# Patient Record
Sex: MALE | Race: WHITE | NOT HISPANIC OR LATINO | Employment: FULL TIME | URBAN - NONMETROPOLITAN AREA
[De-identification: names, ages, dates, MRNs, and addresses within clinical notes are randomized per-mention and may not be internally consistent; named-entity substitution may affect disease eponyms.]

---

## 2017-01-25 ENCOUNTER — TRANSCRIBE ORDERS (OUTPATIENT)
Dept: ADMINISTRATIVE | Facility: HOSPITAL | Age: 22
End: 2017-01-25

## 2017-01-25 DIAGNOSIS — R00.2 PALPITATIONS: Primary | ICD-10-CM

## 2017-02-06 ENCOUNTER — APPOINTMENT (OUTPATIENT)
Dept: CARDIOLOGY | Facility: HOSPITAL | Age: 22
End: 2017-02-06

## 2017-02-13 ENCOUNTER — APPOINTMENT (OUTPATIENT)
Dept: CARDIOLOGY | Facility: HOSPITAL | Age: 22
End: 2017-02-13

## 2018-10-01 ENCOUNTER — OFFICE VISIT (OUTPATIENT)
Dept: ORTHOPEDIC SURGERY | Facility: CLINIC | Age: 23
End: 2018-10-01

## 2018-10-01 VITALS
HEIGHT: 78 IN | WEIGHT: 180 LBS | DIASTOLIC BLOOD PRESSURE: 71 MMHG | SYSTOLIC BLOOD PRESSURE: 125 MMHG | BODY MASS INDEX: 20.83 KG/M2 | HEART RATE: 74 BPM

## 2018-10-01 DIAGNOSIS — S69.91XA HAND INJURY, RIGHT, INITIAL ENCOUNTER: ICD-10-CM

## 2018-10-01 DIAGNOSIS — S62.308A: Primary | ICD-10-CM

## 2018-10-01 PROCEDURE — 26600 TREAT METACARPAL FRACTURE: CPT | Performed by: ORTHOPAEDIC SURGERY
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
## 2018-10-02 PROBLEM — S69.91XA HAND INJURY, RIGHT, INITIAL ENCOUNTER: Status: ACTIVE | Noted: 2018-10-02

## 2023-01-19 ENCOUNTER — HOSPITAL ENCOUNTER (OUTPATIENT)
Dept: RESPIRATORY THERAPY | Facility: HOSPITAL | Age: 28
Discharge: HOME | End: 2023-01-19
Payer: COMMERCIAL

## 2023-01-19 ENCOUNTER — HOSPITAL ENCOUNTER (OUTPATIENT)
Dept: GENERAL RADIOLOGY | Facility: HOSPITAL | Age: 28
Discharge: HOME | End: 2023-01-19
Payer: COMMERCIAL

## 2023-01-19 ENCOUNTER — LAB (OUTPATIENT)
Dept: LAB | Facility: HOSPITAL | Age: 28
End: 2023-01-19
Payer: COMMERCIAL

## 2023-01-19 ENCOUNTER — TRANSCRIBE ORDERS (OUTPATIENT)
Dept: ADMINISTRATIVE | Facility: HOSPITAL | Age: 28
End: 2023-01-19
Payer: COMMERCIAL

## 2023-01-19 DIAGNOSIS — I10 ESSENTIAL (PRIMARY) HYPERTENSION: ICD-10-CM

## 2023-01-19 DIAGNOSIS — R00.0 TACHYCARDIA: ICD-10-CM

## 2023-01-19 DIAGNOSIS — K21.00 GASTRO-ESOPHAGEAL REFLUX DISEASE WITH ESOPHAGITIS, WITHOUT BLEEDING: ICD-10-CM

## 2023-01-19 DIAGNOSIS — F06.4 ANXIETY DISORDER DUE TO KNOWN PHYSIOLOGICAL CONDITION: ICD-10-CM

## 2023-01-19 DIAGNOSIS — E55.9 VITAMIN D DEFICIENCY, UNSPECIFIED: ICD-10-CM

## 2023-01-19 DIAGNOSIS — R00.0 TACHYCARDIA: Primary | ICD-10-CM

## 2023-01-19 PROCEDURE — 93010 ELECTROCARDIOGRAM REPORT: CPT | Performed by: INTERNAL MEDICINE

## 2023-01-19 PROCEDURE — 36415 COLL VENOUS BLD VENIPUNCTURE: CPT

## 2023-01-19 PROCEDURE — 80061 LIPID PANEL: CPT

## 2023-01-19 PROCEDURE — 82306 VITAMIN D 25 HYDROXY: CPT

## 2023-01-19 PROCEDURE — 80050 GENERAL HEALTH PANEL: CPT

## 2023-01-19 PROCEDURE — 84480 ASSAY TRIIODOTHYRONINE (T3): CPT

## 2023-01-19 PROCEDURE — 71046 X-RAY EXAM CHEST 2 VIEWS: CPT | Performed by: RADIOLOGY

## 2023-01-19 PROCEDURE — 71046 X-RAY EXAM CHEST 2 VIEWS: CPT

## 2023-01-19 PROCEDURE — 93005 ELECTROCARDIOGRAM TRACING: CPT | Performed by: NURSE PRACTITIONER

## 2023-01-19 PROCEDURE — 81015 MICROSCOPIC EXAM OF URINE: CPT

## 2023-01-19 PROCEDURE — 84436 ASSAY OF TOTAL THYROXINE: CPT

## 2023-01-20 LAB
25(OH)D3 SERPL-MCNC: 21.5 NG/ML (ref 30–100)
ALBUMIN SERPL-MCNC: 4.6 G/DL (ref 3.5–5.2)
ALBUMIN/GLOB SERPL: 1.8 G/DL
ALP SERPL-CCNC: 93 U/L (ref 39–117)
ALT SERPL W P-5'-P-CCNC: 34 U/L (ref 1–41)
ANION GAP SERPL CALCULATED.3IONS-SCNC: 10 MMOL/L (ref 5–15)
AST SERPL-CCNC: 32 U/L (ref 1–40)
BACTERIA UR QL AUTO: NORMAL /HPF
BASOPHILS # BLD AUTO: 0.05 10*3/MM3 (ref 0–0.2)
BASOPHILS NFR BLD AUTO: 0.5 % (ref 0–1.5)
BILIRUB SERPL-MCNC: 0.8 MG/DL (ref 0–1.2)
BUN SERPL-MCNC: 8 MG/DL (ref 6–20)
BUN/CREAT SERPL: 8.2 (ref 7–25)
CALCIUM SPEC-SCNC: 9.4 MG/DL (ref 8.6–10.5)
CHLORIDE SERPL-SCNC: 98 MMOL/L (ref 98–107)
CHOLEST SERPL-MCNC: 244 MG/DL (ref 0–200)
CO2 SERPL-SCNC: 28 MMOL/L (ref 22–29)
CREAT SERPL-MCNC: 0.97 MG/DL (ref 0.76–1.27)
DEPRECATED RDW RBC AUTO: 40.7 FL (ref 37–54)
EGFRCR SERPLBLD CKD-EPI 2021: 108.4 ML/MIN/1.73
EOSINOPHIL # BLD AUTO: 0.24 10*3/MM3 (ref 0–0.4)
EOSINOPHIL NFR BLD AUTO: 2.3 % (ref 0.3–6.2)
ERYTHROCYTE [DISTWIDTH] IN BLOOD BY AUTOMATED COUNT: 12.8 % (ref 12.3–15.4)
GLOBULIN UR ELPH-MCNC: 2.6 GM/DL
GLUCOSE SERPL-MCNC: 85 MG/DL (ref 65–99)
HCT VFR BLD AUTO: 43.2 % (ref 37.5–51)
HDLC SERPL-MCNC: 33 MG/DL (ref 40–60)
HGB BLD-MCNC: 14.7 G/DL (ref 13–17.7)
HYALINE CASTS UR QL AUTO: NORMAL /LPF
IMM GRANULOCYTES # BLD AUTO: 0.05 10*3/MM3 (ref 0–0.05)
IMM GRANULOCYTES NFR BLD AUTO: 0.5 % (ref 0–0.5)
LDLC SERPL CALC-MCNC: 97 MG/DL (ref 0–100)
LDLC/HDLC SERPL: 2.32 {RATIO}
LYMPHOCYTES # BLD AUTO: 1.94 10*3/MM3 (ref 0.7–3.1)
LYMPHOCYTES NFR BLD AUTO: 18.7 % (ref 19.6–45.3)
MCH RBC QN AUTO: 29.8 PG (ref 26.6–33)
MCHC RBC AUTO-ENTMCNC: 34 G/DL (ref 31.5–35.7)
MCV RBC AUTO: 87.6 FL (ref 79–97)
MONOCYTES # BLD AUTO: 0.73 10*3/MM3 (ref 0.1–0.9)
MONOCYTES NFR BLD AUTO: 7 % (ref 5–12)
NEUTROPHILS NFR BLD AUTO: 7.35 10*3/MM3 (ref 1.7–7)
NEUTROPHILS NFR BLD AUTO: 71 % (ref 42.7–76)
NRBC BLD AUTO-RTO: 0 /100 WBC (ref 0–0.2)
PLATELET # BLD AUTO: 330 10*3/MM3 (ref 140–450)
PMV BLD AUTO: 9.2 FL (ref 6–12)
POTASSIUM SERPL-SCNC: 4.3 MMOL/L (ref 3.5–5.2)
PROT SERPL-MCNC: 7.2 G/DL (ref 6–8.5)
QT INTERVAL: 382 MS
QTC INTERVAL: 438 MS
RBC # BLD AUTO: 4.93 10*6/MM3 (ref 4.14–5.8)
RBC # UR STRIP: NORMAL /HPF
REF LAB TEST METHOD: NORMAL
SODIUM SERPL-SCNC: 136 MMOL/L (ref 136–145)
SQUAMOUS #/AREA URNS HPF: NORMAL /HPF
T3 SERPL-MCNC: 108 NG/DL (ref 80–200)
T4 SERPL-MCNC: 6.82 MCG/DL (ref 4.5–11.7)
TRIGL SERPL-MCNC: 673 MG/DL (ref 0–150)
TSH SERPL DL<=0.05 MIU/L-ACNC: 3.47 UIU/ML (ref 0.27–4.2)
VLDLC SERPL-MCNC: 114 MG/DL (ref 5–40)
WBC # UR STRIP: NORMAL /HPF
WBC NRBC COR # BLD: 10.36 10*3/MM3 (ref 3.4–10.8)

## 2023-02-02 ENCOUNTER — OFFICE VISIT (OUTPATIENT)
Dept: CARDIOLOGY | Facility: CLINIC | Age: 28
End: 2023-02-02
Payer: COMMERCIAL

## 2023-02-02 VITALS
HEIGHT: 71 IN | BODY MASS INDEX: 27.5 KG/M2 | OXYGEN SATURATION: 98 % | SYSTOLIC BLOOD PRESSURE: 130 MMHG | WEIGHT: 196.4 LBS | DIASTOLIC BLOOD PRESSURE: 83 MMHG | HEART RATE: 105 BPM

## 2023-02-02 DIAGNOSIS — R00.2 PALPITATIONS: ICD-10-CM

## 2023-02-02 DIAGNOSIS — R07.2 PRECORDIAL PAIN: Primary | ICD-10-CM

## 2023-02-02 DIAGNOSIS — E78.2 MIXED HYPERLIPIDEMIA: ICD-10-CM

## 2023-02-02 PROCEDURE — 99204 OFFICE O/P NEW MOD 45 MIN: CPT | Performed by: INTERNAL MEDICINE

## 2023-02-02 RX ORDER — SUCRALFATE 1 G/1
TABLET ORAL
COMMUNITY
Start: 2023-01-10

## 2023-02-02 RX ORDER — CHLORAL HYDRATE 500 MG
CAPSULE ORAL 2 TIMES DAILY WITH MEALS
COMMUNITY

## 2023-02-02 RX ORDER — PANTOPRAZOLE SODIUM 20 MG/1
TABLET, DELAYED RELEASE ORAL
COMMUNITY
Start: 2023-01-10

## 2023-02-02 RX ORDER — ROSUVASTATIN CALCIUM 20 MG/1
20 TABLET, COATED ORAL DAILY
COMMUNITY
Start: 2023-01-27

## 2023-02-02 RX ORDER — METOPROLOL SUCCINATE 25 MG/1
12.5 TABLET, EXTENDED RELEASE ORAL DAILY
Qty: 90 TABLET | Refills: 3 | Status: SHIPPED | OUTPATIENT
Start: 2023-02-02

## 2023-02-02 RX ORDER — CHOLECALCIFEROL (VITAMIN D3) 1250 MCG
CAPSULE ORAL
COMMUNITY
Start: 2023-01-27

## 2023-02-02 NOTE — PROGRESS NOTES
Office Note    Subjective     Ian Ramos is a 27 y.o. male who presents to day for chest pain.      Active Problems:  Problem List Items Addressed This Visit    None  Visit Diagnoses     Precordial pain    -  Primary    Relevant Orders    Stress Test With Myocardial Perfusion (1 Day)    Adult Transthoracic Echo Complete w/ Color, Spectral and Contrast if necessary per protocol    Palpitations        Relevant Orders    Cardiac Event Monitor    Mixed hyperlipidemia        Relevant Medications    rosuvastatin (CRESTOR) 20 MG tablet          HPI  Patient is a young 27-year-old gentleman with past medical history significant for reflux, tachycardia, hypercholesterolemia with high triglycerides.  Given the complaints of chest pain which has been going on for couple of months.  It is reducible in nature.  He has always had palpitations in the past.  In symptom he gets dizzy lightheaded.  Patient has wore Holter monitors in the past but never been shown to have any arrhythmias.  Patient has some shortness of breath with this.  No lower extremity edema.  No blackouts.      PRIOR MEDS  Current Outpatient Medications on File Prior to Visit   Medication Sig Dispense Refill   • Cholecalciferol (Vitamin D3) 1.25 MG (36337 UT) capsule TAKE ONE CAPSULE BY MOUTH EVERY WEEK FOR VITAMIN-D REPLACEMENT     • Omega-3 Fatty Acids (fish oil) 1000 MG capsule capsule Take  by mouth 2 (Two) Times a Day With Meals.     • pantoprazole (PROTONIX) 20 MG EC tablet TAKE ONE TABLET BY MOUTH EVERY 12 HOURS FOR STOMACH     • rosuvastatin (CRESTOR) 20 MG tablet Take 20 mg by mouth Daily. for cholesterol     • sucralfate (CARAFATE) 1 g tablet TAKE ONE TABLET BY MOUTH AT HOUR OF SLEEP FOR GERD       No current facility-administered medications on file prior to visit.       ALLERGIES  Patient has no known allergies.    HISTORY  History reviewed. No pertinent past medical history.    Social History     Socioeconomic History   • Marital status: Single  "  Tobacco Use   • Smoking status: Never   • Smokeless tobacco: Current     Types: Snuff   Substance and Sexual Activity   • Alcohol use: Yes     Comment: Social   • Drug use: No   • Sexual activity: Defer       Family History   Problem Relation Age of Onset   • No Known Problems Mother    • No Known Problems Father    • Heart disease Maternal Aunt        Review of Systems   Constitutional: Negative for activity change, appetite change, chills, diaphoresis, fatigue, fever and unexpected weight change.   HENT: Negative for congestion, ear discharge, ear pain, hearing loss, nosebleeds, sore throat and tinnitus.    Eyes: Negative for redness and visual disturbance.   Respiratory: Positive for chest tightness and shortness of breath. Negative for apnea, cough, choking, wheezing and stridor.    Cardiovascular: Positive for chest pain and palpitations. Negative for leg swelling.   Gastrointestinal: Negative for abdominal distention, abdominal pain, constipation, diarrhea, nausea and vomiting.   Endocrine: Negative for cold intolerance, heat intolerance, polydipsia, polyphagia and polyuria.   Genitourinary: Negative for difficulty urinating, flank pain, frequency, hematuria and urgency.   Musculoskeletal: Negative for arthralgias, back pain, gait problem, joint swelling, myalgias and neck pain.   Skin: Negative for pallor and rash.   Neurological: Positive for light-headedness. Negative for dizziness, tremors, seizures, syncope, weakness and headaches.   Hematological: Does not bruise/bleed easily.   Psychiatric/Behavioral: Negative for agitation, hallucinations, self-injury, sleep disturbance and suicidal ideas. The patient is not nervous/anxious.        Objective     VITALS: /83   Pulse 105   Ht 180.3 cm (71\")   Wt 89.1 kg (196 lb 6.4 oz)   SpO2 98%   BMI 27.39 kg/m²     LABS:   Hospital Outpatient Visit on 01/19/2023   Component Date Value Ref Range Status   • QT Interval 01/19/2023 382  ms Final   • QTC " Interval 01/19/2023 438  ms Final   Lab on 01/19/2023   Component Date Value Ref Range Status   • Glucose 01/19/2023 85  65 - 99 mg/dL Final   • BUN 01/19/2023 8  6 - 20 mg/dL Final   • Creatinine 01/19/2023 0.97  0.76 - 1.27 mg/dL Final   • Sodium 01/19/2023 136  136 - 145 mmol/L Final   • Potassium 01/19/2023 4.3  3.5 - 5.2 mmol/L Final   • Chloride 01/19/2023 98  98 - 107 mmol/L Final   • CO2 01/19/2023 28.0  22.0 - 29.0 mmol/L Final   • Calcium 01/19/2023 9.4  8.6 - 10.5 mg/dL Final   • Total Protein 01/19/2023 7.2  6.0 - 8.5 g/dL Final   • Albumin 01/19/2023 4.6  3.5 - 5.2 g/dL Final   • ALT (SGPT) 01/19/2023 34  1 - 41 U/L Final   • AST (SGOT) 01/19/2023 32  1 - 40 U/L Final   • Alkaline Phosphatase 01/19/2023 93  39 - 117 U/L Final   • Total Bilirubin 01/19/2023 0.8  0.0 - 1.2 mg/dL Final   • Globulin 01/19/2023 2.6  gm/dL Final   • A/G Ratio 01/19/2023 1.8  g/dL Final   • BUN/Creatinine Ratio 01/19/2023 8.2  7.0 - 25.0 Final   • Anion Gap 01/19/2023 10.0  5.0 - 15.0 mmol/L Final   • eGFR 01/19/2023 108.4  >60.0 mL/min/1.73 Final   • Total Cholesterol 01/19/2023 244 (H)  0 - 200 mg/dL Final   • Triglycerides 01/19/2023 673 (H)  0 - 150 mg/dL Final   • HDL Cholesterol 01/19/2023 33 (L)  40 - 60 mg/dL Final   • LDL Cholesterol  01/19/2023 97  0 - 100 mg/dL Final   • VLDL Cholesterol 01/19/2023 114 (H)  5 - 40 mg/dL Final   • LDL/HDL Ratio 01/19/2023 2.32   Final   • TSH 01/19/2023 3.470  0.270 - 4.200 uIU/mL Final   • T3, Total 01/19/2023 108.0  80.0 - 200.0 ng/dl Final   • T4, Total 01/19/2023 6.82  4.50 - 11.70 mcg/dL Final   • RBC, UA 01/19/2023 0-2  None Seen, 0-2 /HPF Final   • WBC, UA 01/19/2023 0-2  None Seen, 0-2 /HPF Final   • Bacteria, UA 01/19/2023 None Seen  None Seen /HPF Final   • Squamous Epithelial Cells, UA 01/19/2023 None Seen  None Seen, 0-2 /HPF Final   • Hyaline Casts, UA 01/19/2023 None Seen  None Seen /LPF Final   • Methodology 01/19/2023 Manual Light Microscopy   Final   • 25 Hydroxy,  Vitamin D 01/19/2023 21.5 (L)  30.0 - 100.0 ng/ml Final   • WBC 01/19/2023 10.36  3.40 - 10.80 10*3/mm3 Final   • RBC 01/19/2023 4.93  4.14 - 5.80 10*6/mm3 Final   • Hemoglobin 01/19/2023 14.7  13.0 - 17.7 g/dL Final   • Hematocrit 01/19/2023 43.2  37.5 - 51.0 % Final   • MCV 01/19/2023 87.6  79.0 - 97.0 fL Final   • MCH 01/19/2023 29.8  26.6 - 33.0 pg Final   • MCHC 01/19/2023 34.0  31.5 - 35.7 g/dL Final   • RDW 01/19/2023 12.8  12.3 - 15.4 % Final   • RDW-SD 01/19/2023 40.7  37.0 - 54.0 fl Final   • MPV 01/19/2023 9.2  6.0 - 12.0 fL Final   • Platelets 01/19/2023 330  140 - 450 10*3/mm3 Final   • Neutrophil % 01/19/2023 71.0  42.7 - 76.0 % Final   • Lymphocyte % 01/19/2023 18.7 (L)  19.6 - 45.3 % Final   • Monocyte % 01/19/2023 7.0  5.0 - 12.0 % Final   • Eosinophil % 01/19/2023 2.3  0.3 - 6.2 % Final   • Basophil % 01/19/2023 0.5  0.0 - 1.5 % Final   • Immature Grans % 01/19/2023 0.5  0.0 - 0.5 % Final   • Neutrophils, Absolute 01/19/2023 7.35 (H)  1.70 - 7.00 10*3/mm3 Final   • Lymphocytes, Absolute 01/19/2023 1.94  0.70 - 3.10 10*3/mm3 Final   • Monocytes, Absolute 01/19/2023 0.73  0.10 - 0.90 10*3/mm3 Final   • Eosinophils, Absolute 01/19/2023 0.24  0.00 - 0.40 10*3/mm3 Final   • Basophils, Absolute 01/19/2023 0.05  0.00 - 0.20 10*3/mm3 Final   • Immature Grans, Absolute 01/19/2023 0.05  0.00 - 0.05 10*3/mm3 Final   • nRBC 01/19/2023 0.0  0.0 - 0.2 /100 WBC Final         IMAGING:   XR Chest 2 View    Result Date: 1/19/2023    Unremarkable radiographic appearance of the chest.  This report was finalized on 1/19/2023 1:23 PM by Dr. Liborio Diaz MD.      No results found for this or any previous visit.     No results found for this or any previous visit.          EXAM:  Physical Exam  Vitals and nursing note reviewed.   Constitutional:       General: He is not in acute distress.     Appearance: Normal appearance. He is not ill-appearing or diaphoretic.   HENT:      Head: Normocephalic and atraumatic.      Right  Ear: External ear normal.      Left Ear: External ear normal.      Nose: Nose normal. No congestion or rhinorrhea.      Mouth/Throat:      Mouth: Mucous membranes are moist.      Pharynx: No oropharyngeal exudate.   Eyes:      Extraocular Movements: Extraocular movements intact.      Conjunctiva/sclera: Conjunctivae normal.      Pupils: Pupils are equal, round, and reactive to light.   Neck:      Vascular: No carotid bruit.   Cardiovascular:      Rate and Rhythm: Normal rate and regular rhythm.      Pulses: Normal pulses.      Heart sounds: Normal heart sounds, S1 normal and S2 normal. No murmur heard.   No systolic murmur is present.   No diastolic murmur is present.  Pulmonary:      Effort: Pulmonary effort is normal. No respiratory distress.      Breath sounds: Normal breath sounds. No wheezing, rhonchi or rales.   Abdominal:      General: Abdomen is flat. Bowel sounds are normal.      Palpations: Abdomen is soft.   Musculoskeletal:         General: No swelling, tenderness, deformity or signs of injury.      Cervical back: Normal range of motion and neck supple.      Right lower leg: No edema.      Left lower leg: No edema.   Skin:     General: Skin is warm.      Coloration: Skin is not jaundiced.      Findings: No bruising, erythema or rash.   Neurological:      General: No focal deficit present.      Mental Status: He is alert and oriented to person, place, and time. Mental status is at baseline.   Psychiatric:         Mood and Affect: Mood normal.         Behavior: Behavior normal.         Thought Content: Thought content normal.         Judgment: Judgment normal.         Procedure     ECG 12 Lead    Date/Time: 2/2/2023 4:45 PM  Performed by: Horace Rutledge MD  Authorized by: Horace Rutledge MD   Comparison: compared with previous ECG from 1/19/2023  Comments: Normal sinus rhythm at 92, normal axis, normal intervals, T flattening noted inferior leads.               Assessment & Plan     Diagnoses and all orders  for this visit:    1. Precordial pain (Primary)  -     Stress Test With Myocardial Perfusion (1 Day); Future  -     Adult Transthoracic Echo Complete w/ Color, Spectral and Contrast if necessary per protocol; Future    2. Palpitations  -     Cardiac Event Monitor; Future    3. Mixed hyperlipidemia    Other orders  -     metoprolol succinate XL (TOPROL-XL) 25 MG 24 hr tablet; Take 0.5 tablets by mouth Daily.  Dispense: 90 tablet; Refill: 3  -     ECG 12 Lead      Plan  #1 cardiac.  Patient with respecters for heart disease.  Patient has history of hypertriglyceridemia, tobacco use, dipping.  Has had tachycardia for a while.  Will get a event recorder placed for further evaluation.  Thyroid function will be evaluated    Patient has been having chest pains also.  Need to rule out ischemic reasons for same.  We did a stress and echo done.    We will start patient on low-dose beta-blockers also.  Sinus tachycardia.    Advised not to have caffeine or nicotine, energy drinks.               MEDS ORDERED DURING VISIT:    There are no discontinued medications.     New Medications Ordered This Visit   Medications   • metoprolol succinate XL (TOPROL-XL) 25 MG 24 hr tablet     Sig: Take 0.5 tablets by mouth Daily.     Dispense:  90 tablet     Refill:  3         Follow Up:   No follow-ups on file.    Patient was given instructions and counseling regarding his condition or for health maintenance advice. Please see specific information pulled into the AVS if appropriate.   As always, Eugene Long MD  I appreciate very much the opportunity to participate in the cardiovascular care of your patients. Please do not hesitate to call me with any questions with regards to Ian JACY Richard evaluation and management.         This document has been electronically signed by Horace Rutledge MD [facility], Interventional Cardiology  February 2, 2023 16:45 EST    Dictated Utilizing Dragon Dictation: Part of this note may be an electronic  transcription/translation of spoken language to printed text using the Dragon Dictation System.

## 2023-03-14 PROCEDURE — 93272 ECG/REVIEW INTERPRET ONLY: CPT | Performed by: INTERNAL MEDICINE

## 2023-03-20 ENCOUNTER — TREATMENT (OUTPATIENT)
Dept: CARDIOLOGY | Facility: CLINIC | Age: 28
End: 2023-03-20
Payer: COMMERCIAL

## 2023-03-20 DIAGNOSIS — R00.2 PALPITATIONS: ICD-10-CM

## 2023-03-28 ENCOUNTER — OFFICE VISIT (OUTPATIENT)
Dept: PSYCHIATRY | Facility: CLINIC | Age: 28
End: 2023-03-28
Payer: COMMERCIAL

## 2023-03-28 DIAGNOSIS — F41.1 GENERALIZED ANXIETY DISORDER: Primary | ICD-10-CM

## 2023-03-28 DIAGNOSIS — F90.8 ADHD, ADULT RESIDUAL TYPE: ICD-10-CM

## 2023-03-28 PROCEDURE — 90791 PSYCH DIAGNOSTIC EVALUATION: CPT | Performed by: SOCIAL WORKER

## 2023-03-28 NOTE — PROGRESS NOTES
IDENTIFYING INFORMATION:   The patient, Ian Ramos, is a 27 y.o. male, , father of 1 child, employed, who is here today for initial appointment starting at 2:30 pm. and ending at 3:45 pm.. Patient is being seen at [address], [place] [address].    DATA: Patient comes in reporting that he has been referred by his nurse practitioner, Abril Guzmán, because he is having problems with possible anxiety or ADHD.  Patient reports that he over thinks everything and has difficulty paying attention.        CHIEF COMPLIANT:   Patient reports that in December he had what they thought was probably a panic attack due to the ruling out all possibilities of tachycardia or heart problems.  Patient reports he had an EKG done that was okay.  Patient reports that he was driving along and all of a sudden had an intense fear that he was going to die, difficulty breathing, with chest pains, and extreme nervousness.  Patient reports he stopped all caffeine and since then his symptoms have become worse.  Patient reports that his mind is constantly racing, has poor attention span, is impulsive, and difficulty completing things his entire life.  Patient reports that he is constantly moving and has a job where he is moving a lot.  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  Patient reports that he has lots of ideas but can gets started with those ideas and has procrastination.  Patient reports there is a lot of things that he has not been able to get done.  Patient reports that he is also over thinks everything and ask himself all the time what if scenarios.  Patient reports that he does worry about everything.  Patient reports it takes him a long time to go to sleep and then may sleep only 4 to 5 hours at a time.  Patient reports he also wakes up in the middle of the night as well.  Patient reports scaling his anxiety level at an 8 to a 9  where 10 is worse.  Patient denies feeling depressed.  Patient reports sometimes when he is overwhelmed he just shuts down and ends up doing nothing.  Patient denies ever having any thoughts to harm himself or others at present time or in the past.        PAST PSYCH HISTORY: None    SUBSTANCE ABUSE HISTORY: Patient reports occasionally drinking beer.  Patient denies abusing any drugs or alcohol at present time    FAMILY HISTORY: Patient reports that possibly his mother and father both had anxiety but was never treated for it patient reports that his father probably had ADHD but was never treated for it.  Patient has 2 younger brothers that have difficulty following through and doing things.  Patient was born and raised in [place] and attended [employer] [place] [employer].  Patient reports that he loved to read in school but was terrible at math and did graduate but started working at age 14.  Patient reports that his father worked in a [employer] and his mother was a nurse's aide.  Patient was unsure of any hereditary problems.  Patient has been  for the past 2 years but with his wife for the past 7 years and they have a 1-year-old son.    MEDICAL HISTORY: Patient is average height and weight.  Patient reports being on medication for possible chest pains.  Patient has been in good physical health.    CURRENT MEDICATIONS:  Current Outpatient Medications   Medication Sig Dispense Refill   • Cholecalciferol (Vitamin D3) 1.25 MG (99038 UT) capsule TAKE ONE CAPSULE BY MOUTH EVERY WEEK FOR VITAMIN-D REPLACEMENT     • metoprolol succinate XL (TOPROL-XL) 25 MG 24 hr tablet Take 0.5 tablets by mouth Daily. 90 tablet 3   • Omega-3 Fatty Acids (fish oil) 1000 MG capsule capsule Take  by mouth 2 (Two) Times a Day With Meals.     • pantoprazole (PROTONIX) 20 MG EC tablet TAKE ONE TABLET BY MOUTH EVERY 12 HOURS FOR STOMACH     • rosuvastatin (CRESTOR) 20 MG tablet Take 20 mg by mouth Daily. for cholesterol     • sucralfate  (CARAFATE) 1 g tablet TAKE ONE TABLET BY MOUTH AT HOUR OF SLEEP FOR GERD       No current facility-administered medications for this visit.           MENTAL STATUS EXAM:   Hygiene:   good  Cooperation:  Cooperative  Eye Contact:  Downcast  Psychomotor Behavior:  Restless  Affect:  Full range  Hopelessness: Denies  Speech:  Normal  Thought Process:  Goal directed  Thought Content:  Normal  Suicidal:  None  Homicidal:  None  Hallucinations:  None  Delusion:  None  Memory:  Intact  Orientation:  Person, Place, Time and Situation  Reliability:  good  Insight:  Fair  Judgement:  Fair  Impulse Control:  Fair  Physical/Medical Issues:  Yes Ruling out heart problems    PROBLEM LIST:   Poor concentration, impulsiveness, and constant worries    STRENGTHS:    patient appears motivated for treatment is currently engaged and compliant    WEAKNESSES:  Procrastinates, difficulty completing things, and worries      SHORT-TERM GOALS: Patient will be compliant with clinic appointments.  Patient will be engaged in therapy, medication compliant with minimal side effects. Patient  will report decrease of symptoms and frequency.    LONG-TERM GOALS: Patient will have minimal symptoms of  with continued medication management. Patient will be compliant with treatment and appointments.     DIAGNOSIS:   Encounter Diagnoses   Name Primary?   • Generalized anxiety disorder Yes   • ADHD, adult residual type      Attention deficit hyperactivity disorder, in adulthood and generalized anxiety disorder    PLAN:   Patient will continue in monthly individual outpatient treatment and pharmacotherapy as scheduled.  Patient is being referred to the nurse practitioner for further medication assessment.  Patient was given the Love performance test.  Patient was informed to maintain a daily structured routine and healthy eating to assist with decreasing symptoms.       The patient was instructed to call clinic as needed or go to ER if in crisis.        KAUR ROGERS, ANNETTEW, [facility] [place]

## 2023-04-11 ENCOUNTER — TRANSCRIBE ORDERS (OUTPATIENT)
Dept: ADMINISTRATIVE | Facility: HOSPITAL | Age: 28
End: 2023-04-11
Payer: COMMERCIAL

## 2023-04-11 ENCOUNTER — LAB (OUTPATIENT)
Dept: LAB | Facility: HOSPITAL | Age: 28
End: 2023-04-11
Payer: COMMERCIAL

## 2023-04-11 ENCOUNTER — HOSPITAL ENCOUNTER (OUTPATIENT)
Dept: CARDIOLOGY | Facility: HOSPITAL | Age: 28
Discharge: HOME | End: 2023-04-11
Payer: COMMERCIAL

## 2023-04-11 ENCOUNTER — HOSPITAL ENCOUNTER (OUTPATIENT)
Dept: NUCLEAR MEDICINE | Facility: HOSPITAL | Age: 28
Discharge: HOME | End: 2023-04-11
Payer: COMMERCIAL

## 2023-04-11 DIAGNOSIS — E78.5 HYPERLIPIDEMIA, UNSPECIFIED HYPERLIPIDEMIA TYPE: ICD-10-CM

## 2023-04-11 DIAGNOSIS — R07.2 PRECORDIAL PAIN: ICD-10-CM

## 2023-04-11 DIAGNOSIS — E78.5 HYPERLIPIDEMIA, UNSPECIFIED HYPERLIPIDEMIA TYPE: Primary | ICD-10-CM

## 2023-04-11 LAB
BH CV NUCLEAR PRIOR STUDY: 3
BH CV REST NUCLEAR ISOTOPE DOSE: 10.5 MCI
BH CV STRESS BP STAGE 1: NORMAL
BH CV STRESS BP STAGE 2: NORMAL
BH CV STRESS BP STAGE 3: NORMAL
BH CV STRESS DURATION MIN STAGE 1: 3
BH CV STRESS DURATION MIN STAGE 2: 3
BH CV STRESS DURATION MIN STAGE 3: 3
BH CV STRESS DURATION MIN STAGE 4: 0
BH CV STRESS DURATION SEC STAGE 1: 0
BH CV STRESS DURATION SEC STAGE 2: 0
BH CV STRESS DURATION SEC STAGE 3: 0
BH CV STRESS DURATION SEC STAGE 4: 30
BH CV STRESS GRADE STAGE 1: 10
BH CV STRESS GRADE STAGE 2: 12
BH CV STRESS GRADE STAGE 3: 14
BH CV STRESS GRADE STAGE 4: 16
BH CV STRESS HR STAGE 1: 111
BH CV STRESS HR STAGE 2: 130
BH CV STRESS HR STAGE 3: 160
BH CV STRESS HR STAGE 4: 169
BH CV STRESS METS STAGE 1: 5
BH CV STRESS METS STAGE 2: 7.5
BH CV STRESS METS STAGE 3: 10
BH CV STRESS METS STAGE 4: 13.5
BH CV STRESS NUCLEAR ISOTOPE DOSE: 29.1 MCI
BH CV STRESS PROTOCOL 1: NORMAL
BH CV STRESS RECOVERY BP: NORMAL MMHG
BH CV STRESS RECOVERY HR: 103 BPM
BH CV STRESS SPEED STAGE 1: 1.7
BH CV STRESS SPEED STAGE 2: 2.5
BH CV STRESS SPEED STAGE 3: 3.4
BH CV STRESS SPEED STAGE 4: 4.2
BH CV STRESS STAGE 1: 1
BH CV STRESS STAGE 2: 2
BH CV STRESS STAGE 3: 3
BH CV STRESS STAGE 4: 4
LV EF NUC BP: 67 %
MAXIMAL PREDICTED HEART RATE: 191 BPM
PERCENT MAX PREDICTED HR: 88.48 %
STRESS BASELINE BP: NORMAL MMHG
STRESS BASELINE HR: 95 BPM
STRESS PERCENT HR: 104 %
STRESS POST ESTIMATED WORKLOAD: 11.7 METS
STRESS POST EXERCISE DUR MIN: 9 MIN
STRESS POST EXERCISE DUR SEC: 30 SEC
STRESS POST PEAK BP: NORMAL MMHG
STRESS POST PEAK HR: 169 BPM
STRESS TARGET HR: 162 BPM

## 2023-04-11 PROCEDURE — 93017 CV STRESS TEST TRACING ONLY: CPT

## 2023-04-11 PROCEDURE — 0 TECHNETIUM SESTAMIBI: Performed by: INTERNAL MEDICINE

## 2023-04-11 PROCEDURE — 78452 HT MUSCLE IMAGE SPECT MULT: CPT

## 2023-04-11 PROCEDURE — 80053 COMPREHEN METABOLIC PANEL: CPT

## 2023-04-11 PROCEDURE — A9500 TC99M SESTAMIBI: HCPCS | Performed by: INTERNAL MEDICINE

## 2023-04-11 PROCEDURE — 80061 LIPID PANEL: CPT

## 2023-04-11 PROCEDURE — 93306 TTE W/DOPPLER COMPLETE: CPT

## 2023-04-11 PROCEDURE — 36415 COLL VENOUS BLD VENIPUNCTURE: CPT

## 2023-04-11 RX ADMIN — TECHNETIUM TC 99M SESTAMIBI 1 DOSE: 1 INJECTION INTRAVENOUS at 11:52

## 2023-04-11 RX ADMIN — TECHNETIUM TC 99M SESTAMIBI 1 DOSE: 1 INJECTION INTRAVENOUS at 10:33

## 2023-04-12 LAB
ALBUMIN SERPL-MCNC: 5.1 G/DL (ref 3.5–5.2)
ALBUMIN/GLOB SERPL: 1.8 G/DL
ALP SERPL-CCNC: 94 U/L (ref 39–117)
ALT SERPL W P-5'-P-CCNC: 62 U/L (ref 1–41)
ANION GAP SERPL CALCULATED.3IONS-SCNC: 15.9 MMOL/L (ref 5–15)
AST SERPL-CCNC: 38 U/L (ref 1–40)
BH CV ECHO MEAS - ACS: 1.5 CM
BH CV ECHO MEAS - AO MAX PG: 5.7 MMHG
BH CV ECHO MEAS - AO MEAN PG: 3 MMHG
BH CV ECHO MEAS - AO ROOT DIAM: 2.7 CM
BH CV ECHO MEAS - AO V2 MAX: 119 CM/SEC
BH CV ECHO MEAS - AO V2 VTI: 25.2 CM
BH CV ECHO MEAS - EDV(CUBED): 166.4 ML
BH CV ECHO MEAS - EDV(MOD-SP2): 50 ML
BH CV ECHO MEAS - EDV(MOD-SP4): 45.7 ML
BH CV ECHO MEAS - EF(MOD-BP): 55.7 %
BH CV ECHO MEAS - EF(MOD-SP2): 47 %
BH CV ECHO MEAS - EF(MOD-SP4): 62.8 %
BH CV ECHO MEAS - EF_3D-VOL: 58 %
BH CV ECHO MEAS - ESV(CUBED): 59.3 ML
BH CV ECHO MEAS - ESV(MOD-SP2): 26.5 ML
BH CV ECHO MEAS - ESV(MOD-SP4): 17 ML
BH CV ECHO MEAS - FS: 29.1 %
BH CV ECHO MEAS - IVS/LVPW: 0.83 CM
BH CV ECHO MEAS - IVSD: 0.5 CM
BH CV ECHO MEAS - LA DIMENSION: 3.7 CM
BH CV ECHO MEAS - LAT PEAK E' VEL: 12.9 CM/SEC
BH CV ECHO MEAS - LV DIASTOLIC VOL/BSA (35-75): 21.9 CM2
BH CV ECHO MEAS - LV MASS(C)D: 101.4 GRAMS
BH CV ECHO MEAS - LV SYSTOLIC VOL/BSA (12-30): 8.1 CM2
BH CV ECHO MEAS - LVIDD: 5.5 CM
BH CV ECHO MEAS - LVIDS: 3.9 CM
BH CV ECHO MEAS - LVOT AREA: 3.8 CM2
BH CV ECHO MEAS - LVOT DIAM: 2.2 CM
BH CV ECHO MEAS - LVPWD: 0.6 CM
BH CV ECHO MEAS - MED PEAK E' VEL: 11.3 CM/SEC
BH CV ECHO MEAS - MV A MAX VEL: 51.4 CM/SEC
BH CV ECHO MEAS - MV E MAX VEL: 101 CM/SEC
BH CV ECHO MEAS - MV E/A: 1.96
BH CV ECHO MEAS - PA ACC TIME: 0.08 SEC
BH CV ECHO MEAS - PA PR(ACCEL): 42.6 MMHG
BH CV ECHO MEAS - PI END-D VEL: 107 CM/SEC
BH CV ECHO MEAS - RAP SYSTOLE: 10 MMHG
BH CV ECHO MEAS - RVSP: 25.5 MMHG
BH CV ECHO MEAS - SI(MOD-SP2): 11.2 ML/M2
BH CV ECHO MEAS - SI(MOD-SP4): 13.7 ML/M2
BH CV ECHO MEAS - SV(MOD-SP2): 23.5 ML
BH CV ECHO MEAS - SV(MOD-SP4): 28.7 ML
BH CV ECHO MEAS - TAPSE (>1.6): 2.04 CM
BH CV ECHO MEAS - TR MAX PG: 15.5 MMHG
BH CV ECHO MEAS - TR MAX VEL: 197 CM/SEC
BH CV ECHO MEASUREMENTS AVERAGE E/E' RATIO: 8.35
BH CV XLRA - RV BASE: 2.8 CM
BH CV XLRA - RV LENGTH: 5.3 CM
BH CV XLRA - RV MID: 2.5 CM
BILIRUB SERPL-MCNC: 1.2 MG/DL (ref 0–1.2)
BUN SERPL-MCNC: 10 MG/DL (ref 6–20)
BUN/CREAT SERPL: 9.3 (ref 7–25)
CALCIUM SPEC-SCNC: 9.8 MG/DL (ref 8.6–10.5)
CHLORIDE SERPL-SCNC: 99 MMOL/L (ref 98–107)
CHOLEST SERPL-MCNC: 141 MG/DL (ref 0–200)
CO2 SERPL-SCNC: 21.1 MMOL/L (ref 22–29)
CREAT SERPL-MCNC: 1.07 MG/DL (ref 0.76–1.27)
EGFRCR SERPLBLD CKD-EPI 2021: 96.3 ML/MIN/1.73
GLOBULIN UR ELPH-MCNC: 2.8 GM/DL
GLUCOSE SERPL-MCNC: 96 MG/DL (ref 65–99)
HDLC SERPL-MCNC: 38 MG/DL (ref 40–60)
LDLC SERPL CALC-MCNC: 50 MG/DL (ref 0–100)
LDLC/HDLC SERPL: 0.87 {RATIO}
LEFT ATRIUM VOLUME INDEX: 16.6 ML/M2
MAXIMAL PREDICTED HEART RATE: 191 BPM
POTASSIUM SERPL-SCNC: 4.3 MMOL/L (ref 3.5–5.2)
PROT SERPL-MCNC: 7.9 G/DL (ref 6–8.5)
SODIUM SERPL-SCNC: 136 MMOL/L (ref 136–145)
STRESS TARGET HR: 162 BPM
TRIGL SERPL-MCNC: 350 MG/DL (ref 0–150)
VLDLC SERPL-MCNC: 53 MG/DL (ref 5–40)

## 2023-04-25 ENCOUNTER — OFFICE VISIT (OUTPATIENT)
Dept: CARDIOLOGY | Facility: CLINIC | Age: 28
End: 2023-04-25
Payer: COMMERCIAL

## 2023-04-25 VITALS
BODY MASS INDEX: 39.19 KG/M2 | HEIGHT: 60 IN | SYSTOLIC BLOOD PRESSURE: 122 MMHG | HEART RATE: 109 BPM | WEIGHT: 199.6 LBS | OXYGEN SATURATION: 98 % | DIASTOLIC BLOOD PRESSURE: 84 MMHG

## 2023-04-25 DIAGNOSIS — I20.8 OTHER FORMS OF ANGINA PECTORIS: Primary | ICD-10-CM

## 2023-04-25 DIAGNOSIS — R00.2 PALPITATIONS: ICD-10-CM

## 2023-04-25 PROBLEM — I20.89 OTHER FORMS OF ANGINA PECTORIS: Status: ACTIVE | Noted: 2023-04-25

## 2023-04-25 RX ORDER — ROSUVASTATIN CALCIUM 40 MG/1
TABLET, COATED ORAL
COMMUNITY
Start: 2023-04-21

## 2023-04-25 NOTE — PROGRESS NOTES
Office Note    Subjective     Ian Ramos is a 27 y.o. male who presents to day for follow-up for chest pain      Active Problems:  Problem List Items Addressed This Visit        Cardiac and Vasculature    Palpitations    Other forms of angina pectoris - Primary       HPI  Patient is a pleasant 27-year-old gentleman with episodes of discomfort in the chest.  He had a stress test and echo done.  Stress was negative for ischemia with preserved EF.  Echocardiogram also was normal on April 12, 2023 with EF of 55.7%.  Patient has had no more discomfort in the chest.  He has had no reflux issues denies any dyspnea on exertion or paroxysmal nocturnal dyspnea.    PRIOR MEDS  Current Outpatient Medications on File Prior to Visit   Medication Sig Dispense Refill   • metoprolol succinate XL (TOPROL-XL) 25 MG 24 hr tablet Take 0.5 tablets by mouth Daily. 90 tablet 3   • Omega-3 Fatty Acids (fish oil) 1000 MG capsule capsule Take  by mouth 2 (Two) Times a Day With Meals.     • pantoprazole (PROTONIX) 20 MG EC tablet TAKE ONE TABLET BY MOUTH EVERY 12 HOURS FOR STOMACH     • rosuvastatin (CRESTOR) 40 MG tablet TAKE ONE TABLET BY MOUTH DAILY TO LOWER CHOLESTEROL     • sucralfate (CARAFATE) 1 g tablet TAKE ONE TABLET BY MOUTH AT HOUR OF SLEEP FOR GERD     • Cholecalciferol (Vitamin D3) 1.25 MG (77200 UT) capsule TAKE ONE CAPSULE BY MOUTH EVERY WEEK FOR VITAMIN-D REPLACEMENT     • rosuvastatin (CRESTOR) 20 MG tablet Take 1 tablet by mouth Daily. for cholesterol (Patient not taking: Reported on 4/25/2023)       No current facility-administered medications on file prior to visit.       ALLERGIES  Patient has no known allergies.    HISTORY  History reviewed. No pertinent past medical history.    Social History     Socioeconomic History   • Marital status:    Tobacco Use   • Smoking status: Never   • Smokeless tobacco: Current     Types: Snuff   Substance and Sexual Activity   • Alcohol use: Yes     Comment: Social   • Drug  "use: No   • Sexual activity: Defer       Family History   Problem Relation Age of Onset   • No Known Problems Mother    • No Known Problems Father    • Heart disease Maternal Aunt        Review of Systems   Constitutional: Negative for activity change, appetite change, chills, diaphoresis, fatigue, fever and unexpected weight change.   HENT: Negative for congestion, ear discharge, ear pain, hearing loss, nosebleeds, sore throat and tinnitus.    Eyes: Negative for redness and visual disturbance.   Respiratory: Negative for apnea, cough, choking, chest tightness, shortness of breath, wheezing and stridor.    Cardiovascular: Negative for chest pain, palpitations and leg swelling.   Gastrointestinal: Negative for abdominal distention, abdominal pain, constipation, diarrhea, nausea and vomiting.   Endocrine: Negative for cold intolerance, heat intolerance, polydipsia, polyphagia and polyuria.   Genitourinary: Negative for difficulty urinating, flank pain, frequency, hematuria and urgency.   Musculoskeletal: Negative for arthralgias, back pain, gait problem, joint swelling, myalgias and neck pain.   Skin: Negative for pallor and rash.   Neurological: Negative for dizziness, tremors, seizures, syncope, weakness, light-headedness and headaches.   Hematological: Does not bruise/bleed easily.   Psychiatric/Behavioral: Negative for agitation, hallucinations, self-injury, sleep disturbance and suicidal ideas. The patient is not nervous/anxious.        Objective     VITALS: /84 (BP Location: Left arm, Patient Position: Sitting, Cuff Size: Adult)   Pulse 109   Ht 71 cm (27.95\")   Wt 90.5 kg (199 lb 9.6 oz)   SpO2 98%   .60 kg/m²     LABS:   Hospital Outpatient Visit on 04/11/2023   Component Date Value Ref Range Status   • Target HR (85%) 04/11/2023 162  bpm Final   • Max. Pred. HR (100%) 04/11/2023 191  bpm Final   • Nuclear Prior Study 04/11/2023 3.0   Final   • BH CV REST NUCLEAR ISOTOPE DOSE 04/11/2023 10.5  " mCi Final   • BH CV STRESS NUCLEAR ISOTOPE DOSE 04/11/2023 29.1  mCi Final   • BH CV STRESS PROTOCOL 1 04/11/2023 Adan   Final   • Stage 1 04/11/2023 1   Final   • HR Stage 1 04/11/2023 111   Final   • BP Stage 1 04/11/2023 118/61   Final   • Duration Min Stage 1 04/11/2023 3   Final   • Duration Sec Stage 1 04/11/2023 0   Final   • Grade Stage 1 04/11/2023 10   Final   • Speed Stage 1 04/11/2023 1.7   Final   • BH CV STRESS METS STAGE 1 04/11/2023 5   Final   • Stage 2 04/11/2023 2   Final   • HR Stage 2 04/11/2023 130   Final   • BP Stage 2 04/11/2023 131/49   Final   • Duration Min Stage 2 04/11/2023 3   Final   • Duration Sec Stage 2 04/11/2023 0   Final   • Grade Stage 2 04/11/2023 12   Final   • Speed Stage 2 04/11/2023 2.5   Final   • BH CV STRESS METS STAGE 2 04/11/2023 7.5   Final   • Stage 3 04/11/2023 3   Final   • Duration Min Stage 3 04/11/2023 3   Final   • Duration Sec Stage 3 04/11/2023 0   Final   • Grade Stage 3 04/11/2023 14   Final   • Speed Stage 3 04/11/2023 3.4   Final   • BH CV STRESS METS STAGE 3 04/11/2023 10.0   Final   • Baseline HR 04/11/2023 95  bpm Final   • Baseline BP 04/11/2023 115/73  mmHg Final   • Exercise duration (min) 04/11/2023 9  min Final   • Exercise duration (sec) 04/11/2023 30  sec Final   • Estimated workload 04/11/2023 11.7  METS Final   • HR Stage 3 04/11/2023 160   Final   • BP Stage 3 04/11/2023 159/58   Final   • Stage 4 04/11/2023 4   Final   • HR Stage 4 04/11/2023 169   Final   • Duration Min Stage 4 04/11/2023 0   Final   • Duration Sec Stage 4 04/11/2023 30   Final   • Grade Stage 4 04/11/2023 16   Final   • Speed Stage 4 04/11/2023 4.2   Final   • BH CV STRESS METS STAGE 4 04/11/2023 13.5   Final   • Peak HR 04/11/2023 169  bpm Final   • Percent Max Pred HR 04/11/2023 88.48  % Final   • Percent Target HR 04/11/2023 104  % Final   • Peak BP 04/11/2023 159/58  mmHg Final   • Recovery HR 04/11/2023 103  bpm Final   • Recovery BP 04/11/2023 129/65  mmHg Final    • Nuc Stress EF 04/11/2023 67  % Final   Hospital Outpatient Visit on 04/11/2023   Component Date Value Ref Range Status   • Target HR (85%) 04/11/2023 162  bpm Final   • Max. Pred. HR (100%) 04/11/2023 191  bpm Final   • EF(MOD-bp) 04/11/2023 55.7  % Final   • EF_3D-VOL 04/11/2023 58.0  % Final   • LVIDd 04/11/2023 5.5  cm Final   • LVIDs 04/11/2023 3.9  cm Final   • IVSd 04/11/2023 0.50  cm Final   • LVPWd 04/11/2023 0.60  cm Final   • FS 04/11/2023 29.1  % Final   • IVS/LVPW 04/11/2023 0.83  cm Final   • ESV(cubed) 04/11/2023 59.3  ml Final   • LV Sys Vol (BSA corrected) 04/11/2023 8.1  cm2 Final   • EDV(cubed) 04/11/2023 166.4  ml Final   • LV Carter Vol (BSA corrected) 04/11/2023 21.9  cm2 Final   • LVOT area 04/11/2023 3.8  cm2 Final   • LV mass(C)d 04/11/2023 101.4  grams Final   • LVOT diam 04/11/2023 2.20  cm Final   • EDV(MOD-sp2) 04/11/2023 50.0  ml Final   • EDV(MOD-sp4) 04/11/2023 45.7  ml Final   • ESV(MOD-sp2) 04/11/2023 26.5  ml Final   • ESV(MOD-sp4) 04/11/2023 17.0  ml Final   • SV(MOD-sp2) 04/11/2023 23.5  ml Final   • SV(MOD-sp4) 04/11/2023 28.7  ml Final   • SI(MOD-sp2) 04/11/2023 11.2  ml/m2 Final   • SI(MOD-sp4) 04/11/2023 13.7  ml/m2 Final   • EF(MOD-sp2) 04/11/2023 47.0  % Final   • EF(MOD-sp4) 04/11/2023 62.8  % Final   • MV E max florentino 04/11/2023 101.0  cm/sec Final   • MV A max florentino 04/11/2023 51.4  cm/sec Final   • MV E/A 04/11/2023 1.96   Final   • LA ESV Index (BP) 04/11/2023 16.6  ml/m2 Final   • Med Peak E' Florentino 04/11/2023 11.3  cm/sec Final   • Lat Peak E' Florentino 04/11/2023 12.9  cm/sec Final   • Avg E/e' ratio 04/11/2023 8.35   Final   • RV Base 04/11/2023 2.8  cm Final   • RV Mid 04/11/2023 2.5  cm Final   • RV Length 04/11/2023 5.3  cm Final   • TAPSE (>1.6) 04/11/2023 2.04  cm Final   • LA dimension (2D)  04/11/2023 3.7  cm Final   • Ao pk florentino 04/11/2023 119.0  cm/sec Final   • Ao max PG 04/11/2023 5.7  mmHg Final   • Ao mean PG 04/11/2023 3.0  mmHg Final   • Ao V2 VTI 04/11/2023 25.2   cm Final   • TR max héctor 04/11/2023 197.0  cm/sec Final   • TR max PG 04/11/2023 15.5  mmHg Final   • RVSP(TR) 04/11/2023 25.5  mmHg Final   • RAP systole 04/11/2023 10.0  mmHg Final   • PA acc time 04/11/2023 0.08  sec Final   • PA pr(Accel) 04/11/2023 42.6  mmHg Final   • PI end-d héctor 04/11/2023 107.0  cm/sec Final   • Ao root diam 04/11/2023 2.7  cm Final   • ACS 04/11/2023 1.50  cm Final   Lab on 04/11/2023   Component Date Value Ref Range Status   • Total Cholesterol 04/11/2023 141  0 - 200 mg/dL Final   • Triglycerides 04/11/2023 350 (H)  0 - 150 mg/dL Final   • HDL Cholesterol 04/11/2023 38 (L)  40 - 60 mg/dL Final   • LDL Cholesterol  04/11/2023 50  0 - 100 mg/dL Final   • VLDL Cholesterol 04/11/2023 53 (H)  5 - 40 mg/dL Final   • LDL/HDL Ratio 04/11/2023 0.87   Final   • Glucose 04/11/2023 96  65 - 99 mg/dL Final   • BUN 04/11/2023 10  6 - 20 mg/dL Final   • Creatinine 04/11/2023 1.07  0.76 - 1.27 mg/dL Final   • Sodium 04/11/2023 136  136 - 145 mmol/L Final   • Potassium 04/11/2023 4.3  3.5 - 5.2 mmol/L Final   • Chloride 04/11/2023 99  98 - 107 mmol/L Final   • CO2 04/11/2023 21.1 (L)  22.0 - 29.0 mmol/L Final   • Calcium 04/11/2023 9.8  8.6 - 10.5 mg/dL Final   • Total Protein 04/11/2023 7.9  6.0 - 8.5 g/dL Final   • Albumin 04/11/2023 5.1  3.5 - 5.2 g/dL Final   • ALT (SGPT) 04/11/2023 62 (H)  1 - 41 U/L Final   • AST (SGOT) 04/11/2023 38  1 - 40 U/L Final   • Alkaline Phosphatase 04/11/2023 94  39 - 117 U/L Final   • Total Bilirubin 04/11/2023 1.2  0.0 - 1.2 mg/dL Final   • Globulin 04/11/2023 2.8  gm/dL Final   • A/G Ratio 04/11/2023 1.8  g/dL Final   • BUN/Creatinine Ratio 04/11/2023 9.3  7.0 - 25.0 Final   • Anion Gap 04/11/2023 15.9 (H)  5.0 - 15.0 mmol/L Final   • eGFR 04/11/2023 96.3  >60.0 mL/min/1.73 Final         IMAGING:   XR Chest 2 View    Result Date: 1/19/2023    Unremarkable radiographic appearance of the chest.  This report was finalized on 1/19/2023 1:23 PM by Dr. Liborio Diaz MD.       Results for orders placed during the hospital encounter of 04/11/23    Stress Test With Myocardial Perfusion (1 Day)    Interpretation Summary  •  (Calculated EF = 67%).  •  Low risk for ischemic heart disease.  •  Myocardial perfusion imaging indicates a normal myocardial perfusion study with no evidence of ischemia.  •  Impressions are consistent with a low risk study.  •  TID 0.93.  •  Findings consistent with a normal ECG stress test.     No results found for this or any previous visit.          EXAM:  Constitutional:       General: Awake.      Appearance: Healthy appearance. Not in distress.   Eyes:      Conjunctiva/sclera: Conjunctivae normal.   HENT:      Head: Normocephalic and atraumatic.      Nose: Nose normal.    Mouth/Throat:      Pharynx: Oropharynx is clear.   Neck:      Thyroid: Thyroid normal.      Vascular: No carotid bruit or JVD.   Pulmonary:      Effort: Pulmonary effort is normal.      Breath sounds: Normal breath sounds.   Chest:      Chest wall: Not tender to palpatation.   Cardiovascular:      PMI at left midclavicular line. Normal rate. Regular rhythm. Normal S1 with normal intensity. Normal S2 with normal intensity.      Murmurs: There is no murmur.      No gallop. No rub.   Pulses:     Intact distal pulses.   Edema:     Peripheral edema absent.   Abdominal:      General: Bowel sounds are normal. There is no distension.      Palpations: Abdomen is soft. There is no hepatomegaly.      Tenderness: There is no abdominal tenderness.   Musculoskeletal: Normal range of motion.      Cervical back: Normal range of motion. Skin:     General: Skin is warm and dry. There is no cyanosis.      Coloration: Skin is not jaundiced.   Neurological:      Mental Status: Alert and oriented to person, place and time.      Motor: Motor function is intact.      Gait: Gait is intact.   Psychiatric:         Attention and Perception: Attention and perception normal.         Speech: Speech normal.          Behavior: Behavior is cooperative.         Cognition and Memory: Cognition and memory normal.         Judgment: Judgment normal.          Procedure   Procedures       Assessment & Plan     Diagnoses and all orders for this visit:    1. Other forms of angina pectoris (Primary)    2. Palpitations          PLAN  #1 cardiac.  Patient with episodes of discomfort in the chest and had ischemic evaluation done which was negative for any reversible ischemia with preserved LV ejection fraction.  We will continue to modify risk factors for heart disease  2 hyperlipidemia.  Patient has had high total cholesterol and triglycerides as well as LDL.  He is taking cholesterol-lowering medications and his repeat levels are much better now.  We will continue to follow him closely and clinically.  Continue current medication           MEDS ORDERED DURING VISIT:    There are no discontinued medications.     No orders of the defined types were placed in this encounter.        Follow Up:   Return in about 6 months (around 10/25/2023) for Recheck.    Patient was given instructions and counseling regarding his condition or for health maintenance advice. Please see specific information pulled into the AVS if appropriate.   As always, Tamara Guzmán APRN  I appreciate very much the opportunity to participate in the cardiovascular care of your patients. Please do not hesitate to call me with any questions with regards to Ian Ramos evaluation and management.         This document has been electronically signed by Horace Rutledge MD [facility], Interventional Cardiology  April 25, 2023 16:46 EDT    Dictated Utilizing Dragon Dictation: Part of this note may be an electronic transcription/translation of spoken language to printed text using the Dragon Dictation System.

## 2023-10-25 ENCOUNTER — TRANSCRIBE ORDERS (OUTPATIENT)
Dept: ADMINISTRATIVE | Facility: HOSPITAL | Age: 28
End: 2023-10-25
Payer: COMMERCIAL

## 2023-10-25 ENCOUNTER — LAB (OUTPATIENT)
Dept: LAB | Facility: HOSPITAL | Age: 28
End: 2023-10-25
Payer: COMMERCIAL

## 2023-10-25 DIAGNOSIS — E55.9 VITAMIN D DEFICIENCY: ICD-10-CM

## 2023-10-25 DIAGNOSIS — E78.5 HYPERLIPIDEMIA, UNSPECIFIED HYPERLIPIDEMIA TYPE: ICD-10-CM

## 2023-10-25 DIAGNOSIS — I10 ESSENTIAL (PRIMARY) HYPERTENSION: ICD-10-CM

## 2023-10-25 DIAGNOSIS — R53.83 OTHER FATIGUE: Primary | ICD-10-CM

## 2023-10-25 DIAGNOSIS — R53.83 OTHER FATIGUE: ICD-10-CM

## 2023-10-25 PROCEDURE — 82306 VITAMIN D 25 HYDROXY: CPT

## 2023-10-25 PROCEDURE — 83036 HEMOGLOBIN GLYCOSYLATED A1C: CPT

## 2023-10-25 PROCEDURE — 80061 LIPID PANEL: CPT

## 2023-10-25 PROCEDURE — 36415 COLL VENOUS BLD VENIPUNCTURE: CPT

## 2023-10-25 PROCEDURE — 80050 GENERAL HEALTH PANEL: CPT

## 2023-10-25 PROCEDURE — 81001 URINALYSIS AUTO W/SCOPE: CPT

## 2023-10-25 PROCEDURE — 84403 ASSAY OF TOTAL TESTOSTERONE: CPT

## 2023-10-25 PROCEDURE — 84436 ASSAY OF TOTAL THYROXINE: CPT

## 2023-10-25 PROCEDURE — 84480 ASSAY TRIIODOTHYRONINE (T3): CPT

## 2023-10-26 LAB
25(OH)D3 SERPL-MCNC: 30 NG/ML (ref 30–100)
ALBUMIN SERPL-MCNC: 5.1 G/DL (ref 3.5–5.2)
ALBUMIN/GLOB SERPL: 1.8 G/DL
ALP SERPL-CCNC: 100 U/L (ref 39–117)
ALT SERPL W P-5'-P-CCNC: 96 U/L (ref 1–41)
AMORPH URATE CRY URNS QL MICRO: ABNORMAL /HPF
ANION GAP SERPL CALCULATED.3IONS-SCNC: 7.7 MMOL/L (ref 5–15)
AST SERPL-CCNC: 58 U/L (ref 1–40)
BACTERIA UR QL AUTO: ABNORMAL /HPF
BASOPHILS # BLD AUTO: 0.05 10*3/MM3 (ref 0–0.2)
BASOPHILS NFR BLD AUTO: 0.6 % (ref 0–1.5)
BILIRUB SERPL-MCNC: 1 MG/DL (ref 0–1.2)
BILIRUB UR QL STRIP: NEGATIVE
BUN SERPL-MCNC: 14 MG/DL (ref 6–20)
BUN/CREAT SERPL: 14.3 (ref 7–25)
CALCIUM SPEC-SCNC: 9.9 MG/DL (ref 8.6–10.5)
CHLORIDE SERPL-SCNC: 102 MMOL/L (ref 98–107)
CHOLEST SERPL-MCNC: 287 MG/DL (ref 0–200)
CLARITY UR: ABNORMAL
CO2 SERPL-SCNC: 28.3 MMOL/L (ref 22–29)
COLOR UR: ABNORMAL
CREAT SERPL-MCNC: 0.98 MG/DL (ref 0.76–1.27)
DEPRECATED RDW RBC AUTO: 43.1 FL (ref 37–54)
EGFRCR SERPLBLD CKD-EPI 2021: 106.4 ML/MIN/1.73
EOSINOPHIL # BLD AUTO: 0.15 10*3/MM3 (ref 0–0.4)
EOSINOPHIL NFR BLD AUTO: 1.9 % (ref 0.3–6.2)
ERYTHROCYTE [DISTWIDTH] IN BLOOD BY AUTOMATED COUNT: 13.5 % (ref 12.3–15.4)
GLOBULIN UR ELPH-MCNC: 2.9 GM/DL
GLUCOSE SERPL-MCNC: 95 MG/DL (ref 65–99)
GLUCOSE UR STRIP-MCNC: NEGATIVE MG/DL
HBA1C MFR BLD: 4.9 % (ref 4.8–5.6)
HCT VFR BLD AUTO: 46.3 % (ref 37.5–51)
HDLC SERPL-MCNC: 37 MG/DL (ref 40–60)
HGB BLD-MCNC: 16 G/DL (ref 13–17.7)
HGB UR QL STRIP.AUTO: NEGATIVE
HYALINE CASTS UR QL AUTO: ABNORMAL /LPF
IMM GRANULOCYTES # BLD AUTO: 0.02 10*3/MM3 (ref 0–0.05)
IMM GRANULOCYTES NFR BLD AUTO: 0.3 % (ref 0–0.5)
KETONES UR QL STRIP: ABNORMAL
LDLC SERPL CALC-MCNC: 111 MG/DL (ref 0–100)
LDLC/HDLC SERPL: 2.54 {RATIO}
LEUKOCYTE ESTERASE UR QL STRIP.AUTO: ABNORMAL
LYMPHOCYTES # BLD AUTO: 2.14 10*3/MM3 (ref 0.7–3.1)
LYMPHOCYTES NFR BLD AUTO: 27.4 % (ref 19.6–45.3)
MCH RBC QN AUTO: 30.2 PG (ref 26.6–33)
MCHC RBC AUTO-ENTMCNC: 34.6 G/DL (ref 31.5–35.7)
MCV RBC AUTO: 87.4 FL (ref 79–97)
MONOCYTES # BLD AUTO: 0.53 10*3/MM3 (ref 0.1–0.9)
MONOCYTES NFR BLD AUTO: 6.8 % (ref 5–12)
MUCOUS THREADS URNS QL MICRO: ABNORMAL /HPF
NEUTROPHILS NFR BLD AUTO: 4.92 10*3/MM3 (ref 1.7–7)
NEUTROPHILS NFR BLD AUTO: 63 % (ref 42.7–76)
NITRITE UR QL STRIP: NEGATIVE
NRBC BLD AUTO-RTO: 0 /100 WBC (ref 0–0.2)
PH UR STRIP.AUTO: 5.5 [PH] (ref 5–8)
PLATELET # BLD AUTO: 348 10*3/MM3 (ref 140–450)
PMV BLD AUTO: 9.4 FL (ref 6–12)
POTASSIUM SERPL-SCNC: 4.5 MMOL/L (ref 3.5–5.2)
PROT SERPL-MCNC: 8 G/DL (ref 6–8.5)
PROT UR QL STRIP: ABNORMAL
RBC # BLD AUTO: 5.3 10*6/MM3 (ref 4.14–5.8)
RBC # UR STRIP: ABNORMAL /HPF
REF LAB TEST METHOD: ABNORMAL
SODIUM SERPL-SCNC: 138 MMOL/L (ref 136–145)
SP GR UR STRIP: 1.03 (ref 1–1.03)
SQUAMOUS #/AREA URNS HPF: ABNORMAL /HPF
T3 SERPL-MCNC: 110 NG/DL (ref 80–200)
T4 SERPL-MCNC: 8.06 MCG/DL (ref 4.5–11.7)
TESTOST SERPL-MCNC: 351 NG/DL (ref 249–836)
TRIGL SERPL-MCNC: 780 MG/DL (ref 0–150)
TSH SERPL DL<=0.05 MIU/L-ACNC: 2.49 UIU/ML (ref 0.27–4.2)
UROBILINOGEN UR QL STRIP: ABNORMAL
VLDLC SERPL-MCNC: 139 MG/DL (ref 5–40)
WBC # UR STRIP: ABNORMAL /HPF
WBC NRBC COR # BLD: 7.81 10*3/MM3 (ref 3.4–10.8)

## 2024-02-26 RX ORDER — METOPROLOL SUCCINATE 25 MG/1
12.5 TABLET, EXTENDED RELEASE ORAL DAILY
Qty: 15 TABLET | Refills: 6 | Status: SHIPPED | OUTPATIENT
Start: 2024-02-26

## 2024-04-03 RX ORDER — METOPROLOL SUCCINATE 25 MG/1
12.5 TABLET, EXTENDED RELEASE ORAL DAILY
Qty: 15 TABLET | Refills: 3 | Status: SHIPPED | OUTPATIENT
Start: 2024-04-03

## 2024-05-09 ENCOUNTER — TRANSCRIBE ORDERS (OUTPATIENT)
Dept: ADMINISTRATIVE | Facility: HOSPITAL | Age: 29
End: 2024-05-09
Payer: COMMERCIAL

## 2024-05-09 ENCOUNTER — LAB (OUTPATIENT)
Dept: LAB | Facility: HOSPITAL | Age: 29
End: 2024-05-09
Payer: COMMERCIAL

## 2024-05-09 DIAGNOSIS — R10.13 EPIGASTRIC PAIN: ICD-10-CM

## 2024-05-09 DIAGNOSIS — K21.00 GASTRO-ESOPHAGEAL REFLUX DISEASE WITH ESOPHAGITIS, WITHOUT BLEEDING: ICD-10-CM

## 2024-05-09 DIAGNOSIS — F06.4 ANXIETY DISORDER DUE TO KNOWN PHYSIOLOGICAL CONDITION: Primary | ICD-10-CM

## 2024-05-09 DIAGNOSIS — R06.82 TACHYPNEA: ICD-10-CM

## 2024-05-09 DIAGNOSIS — R53.83 OTHER FATIGUE: ICD-10-CM

## 2024-05-09 DIAGNOSIS — E78.5 HYPERLIPIDEMIA, UNSPECIFIED HYPERLIPIDEMIA TYPE: ICD-10-CM

## 2024-05-09 DIAGNOSIS — F06.4 ANXIETY DISORDER DUE TO KNOWN PHYSIOLOGICAL CONDITION: ICD-10-CM

## 2024-05-09 PROCEDURE — 83690 ASSAY OF LIPASE: CPT

## 2024-05-09 PROCEDURE — 82306 VITAMIN D 25 HYDROXY: CPT

## 2024-05-09 PROCEDURE — 80061 LIPID PANEL: CPT

## 2024-05-09 PROCEDURE — 82607 VITAMIN B-12: CPT

## 2024-05-09 PROCEDURE — 82150 ASSAY OF AMYLASE: CPT

## 2024-05-09 PROCEDURE — 80050 GENERAL HEALTH PANEL: CPT

## 2024-05-09 PROCEDURE — 84403 ASSAY OF TOTAL TESTOSTERONE: CPT

## 2024-05-09 PROCEDURE — 36415 COLL VENOUS BLD VENIPUNCTURE: CPT

## 2024-05-10 LAB
25(OH)D3 SERPL-MCNC: 50.1 NG/ML (ref 30–100)
ALBUMIN SERPL-MCNC: 4.8 G/DL (ref 3.5–5.2)
ALBUMIN/GLOB SERPL: 1.6 G/DL
ALP SERPL-CCNC: 92 U/L (ref 39–117)
ALT SERPL W P-5'-P-CCNC: 60 U/L (ref 1–41)
AMYLASE SERPL-CCNC: 42 U/L (ref 28–100)
ANION GAP SERPL CALCULATED.3IONS-SCNC: 12.9 MMOL/L (ref 5–15)
AST SERPL-CCNC: 39 U/L (ref 1–40)
BASOPHILS # BLD AUTO: 0.04 10*3/MM3 (ref 0–0.2)
BASOPHILS NFR BLD AUTO: 0.5 % (ref 0–1.5)
BILIRUB SERPL-MCNC: 0.9 MG/DL (ref 0–1.2)
BUN SERPL-MCNC: 12 MG/DL (ref 6–20)
BUN/CREAT SERPL: 11.8 (ref 7–25)
CALCIUM SPEC-SCNC: 9.8 MG/DL (ref 8.6–10.5)
CHLORIDE SERPL-SCNC: 100 MMOL/L (ref 98–107)
CHOLEST SERPL-MCNC: 161 MG/DL (ref 0–200)
CO2 SERPL-SCNC: 26.1 MMOL/L (ref 22–29)
CREAT SERPL-MCNC: 1.02 MG/DL (ref 0.76–1.27)
DEPRECATED RDW RBC AUTO: 38.5 FL (ref 37–54)
EGFRCR SERPLBLD CKD-EPI 2021: 101.4 ML/MIN/1.73
EOSINOPHIL # BLD AUTO: 0.08 10*3/MM3 (ref 0–0.4)
EOSINOPHIL NFR BLD AUTO: 1.1 % (ref 0.3–6.2)
ERYTHROCYTE [DISTWIDTH] IN BLOOD BY AUTOMATED COUNT: 12.5 % (ref 12.3–15.4)
GLOBULIN UR ELPH-MCNC: 3 GM/DL
GLUCOSE SERPL-MCNC: 93 MG/DL (ref 65–99)
HCT VFR BLD AUTO: 43.9 % (ref 37.5–51)
HDLC SERPL-MCNC: 34 MG/DL (ref 40–60)
HGB BLD-MCNC: 15.1 G/DL (ref 13–17.7)
IMM GRANULOCYTES # BLD AUTO: 0.02 10*3/MM3 (ref 0–0.05)
IMM GRANULOCYTES NFR BLD AUTO: 0.3 % (ref 0–0.5)
LDLC SERPL CALC-MCNC: 55 MG/DL (ref 0–100)
LDLC/HDLC SERPL: 0.91 {RATIO}
LIPASE SERPL-CCNC: 39 U/L (ref 13–60)
LYMPHOCYTES # BLD AUTO: 2.05 10*3/MM3 (ref 0.7–3.1)
LYMPHOCYTES NFR BLD AUTO: 27.8 % (ref 19.6–45.3)
MCH RBC QN AUTO: 29.4 PG (ref 26.6–33)
MCHC RBC AUTO-ENTMCNC: 34.4 G/DL (ref 31.5–35.7)
MCV RBC AUTO: 85.4 FL (ref 79–97)
MONOCYTES # BLD AUTO: 0.44 10*3/MM3 (ref 0.1–0.9)
MONOCYTES NFR BLD AUTO: 6 % (ref 5–12)
NEUTROPHILS NFR BLD AUTO: 4.74 10*3/MM3 (ref 1.7–7)
NEUTROPHILS NFR BLD AUTO: 64.3 % (ref 42.7–76)
NRBC BLD AUTO-RTO: 0 /100 WBC (ref 0–0.2)
PLATELET # BLD AUTO: 340 10*3/MM3 (ref 140–450)
PMV BLD AUTO: 9.4 FL (ref 6–12)
POTASSIUM SERPL-SCNC: 4.4 MMOL/L (ref 3.5–5.2)
PROT SERPL-MCNC: 7.8 G/DL (ref 6–8.5)
RBC # BLD AUTO: 5.14 10*6/MM3 (ref 4.14–5.8)
SODIUM SERPL-SCNC: 139 MMOL/L (ref 136–145)
TESTOST SERPL-MCNC: 295 NG/DL (ref 249–836)
TRIGL SERPL-MCNC: 481 MG/DL (ref 0–150)
TSH SERPL DL<=0.05 MIU/L-ACNC: 2.81 UIU/ML (ref 0.27–4.2)
VIT B12 BLD-MCNC: 757 PG/ML (ref 211–946)
VLDLC SERPL-MCNC: 72 MG/DL (ref 5–40)
WBC NRBC COR # BLD AUTO: 7.37 10*3/MM3 (ref 3.4–10.8)

## 2024-06-11 ENCOUNTER — OFFICE VISIT (OUTPATIENT)
Dept: CARDIOLOGY | Facility: CLINIC | Age: 29
End: 2024-06-11
Payer: COMMERCIAL

## 2024-06-11 DIAGNOSIS — E78.5 DYSLIPIDEMIA: Primary | Chronic | ICD-10-CM

## 2024-06-11 DIAGNOSIS — R00.2 PALPITATIONS: Chronic | ICD-10-CM

## 2024-06-11 DIAGNOSIS — R00.0 SINUS TACHYCARDIA: Chronic | ICD-10-CM

## 2024-06-11 PROCEDURE — 93000 ELECTROCARDIOGRAM COMPLETE: CPT | Performed by: NURSE PRACTITIONER

## 2024-06-11 PROCEDURE — 99213 OFFICE O/P EST LOW 20 MIN: CPT | Performed by: NURSE PRACTITIONER

## 2024-06-11 RX ORDER — FENOFIBRATE 145 MG/1
145 TABLET, COATED ORAL DAILY
Qty: 90 TABLET | Refills: 3 | Status: SHIPPED | OUTPATIENT
Start: 2024-06-11

## 2024-06-11 RX ORDER — METOPROLOL SUCCINATE 25 MG/1
25 TABLET, EXTENDED RELEASE ORAL DAILY
Qty: 30 TABLET | Refills: 3 | Status: SHIPPED | OUTPATIENT
Start: 2024-06-11

## 2024-06-11 RX ORDER — HYDROXYZINE HYDROCHLORIDE 25 MG/1
25 TABLET, FILM COATED ORAL 3 TIMES DAILY PRN
COMMUNITY

## 2024-06-11 NOTE — PROGRESS NOTES
"Chief Complaint  Follow-up (Rapid heart beat )    Subjective          Ian Ramos presents to [facility] for follow up.    History of Present Illness    Ian Ramos was last seen in clinic on 4/25/2023 by Dr. Rutledge.  Negative results of his stress test and echocardiogram were discussed at that visit.    Ian is accompanied by his wife for today's visit.  Pt reports that he continue palpitations and some chest discomfort.  He does admit that he has \"anxiety\".  He states he often feels his heart beating in his chest and in his ears.        Objective     Vital Signs:   /85 (BP Location: Left arm, Patient Position: Sitting, Cuff Size: Adult)   Pulse 105   Ht 180.3 cm (71\")   Wt 89.8 kg (198 lb)   SpO2 98%   BMI 27.62 kg/m²       Physical Exam  Vitals reviewed.   Constitutional:       Appearance: Normal appearance. He is well-developed.   Cardiovascular:      Rate and Rhythm: Normal rate and regular rhythm.      Heart sounds: No murmur heard.     No friction rub. No gallop.   Pulmonary:      Effort: Pulmonary effort is normal. No respiratory distress.      Breath sounds: Normal breath sounds. No wheezing or rales.   Skin:     General: Skin is warm and dry.   Neurological:      Mental Status: He is alert and oriented to person, place, and time.   Psychiatric:         Mood and Affect: Mood normal.         Behavior: Behavior normal.          Result Review :            ECG 12 Lead    Date/Time: 6/11/2024 7:29 PM  Performed by: Jeni Peter APRN    Authorized by: Jeni Peter APRN  Comparison: compared with previous ECG from 2/2/2023  Similar to previous ECG  Comparison to previous ECG: NSR 92, non spec t wave  Rhythm: sinus tachycardia  Rate: tachycardic  BPM: 104  Comments: QTc 390           Most recent echocardiogram  Results for orders placed during the hospital encounter of 04/11/23    Adult Transthoracic Echo Complete w/ Color, Spectral and Contrast if necessary " per protocol    Interpretation Summary    Left ventricular systolic function is normal. Calculated left ventricular EF = 55.7% Left ventricular ejection fraction appears to be 56 - 60%.    Left ventricular diastolic function was normal.    Estimated right ventricular systolic pressure from tricuspid regurgitation is normal (<35 mmHg).      Most recent Stress Test  Results for orders placed during the hospital encounter of 04/11/23    Stress Test With Myocardial Perfusion (1 Day)    Interpretation Summary    (Calculated EF = 67%).    Low risk for ischemic heart disease.    Myocardial perfusion imaging indicates a normal myocardial perfusion study with no evidence of ischemia.    Impressions are consistent with a low risk study.    TID 0.93.    Findings consistent with a normal ECG stress test.       Most recent Cardiac Cath      Current Outpatient Medications   Medication Sig Dispense Refill    hydrOXYzine (ATARAX) 25 MG tablet Take 1 tablet by mouth 3 (Three) Times a Day As Needed for Itching.      metoprolol succinate XL (TOPROL-XL) 25 MG 24 hr tablet Take 1 tablet by mouth Daily. 30 tablet 3    Omega-3 Fatty Acids (fish oil) 1000 MG capsule capsule Take  by mouth 2 (Two) Times a Day With Meals.      pantoprazole (PROTONIX) 20 MG EC tablet TAKE ONE TABLET BY MOUTH EVERY 12 HOURS FOR STOMACH      rosuvastatin (CRESTOR) 40 MG tablet TAKE ONE TABLET BY MOUTH DAILY TO LOWER CHOLESTEROL      sucralfate (CARAFATE) 1 g tablet TAKE ONE TABLET BY MOUTH AT HOUR OF SLEEP FOR GERD      fenofibrate (TRICOR) 145 MG tablet Take 1 tablet by mouth Daily. 90 tablet 3     No current facility-administered medications for this visit.            Assessment and Plan    Problem List Items Addressed This Visit          Cardiac and Vasculature    Palpitations (Chronic)    Dyslipidemia - Primary (Chronic)    Overview     5/9/2024 total cholesterol 161, triglycerides 481, HDL 34, and LDL 55         Sinus tachycardia (Chronic)    Relevant  Orders    ECG 12 Lead           Follow Up     Medications were reviewed with the patient.    I rereviewed both his stress test and echocardiogram with him today.  I reviewed his EKG with him.  I have tried to reassure him regarding his cardiac status.    For his elevated heart rate today I have given him a prescription for metoprolol.  I have explained that this will help with the palpitations.    He did have labs drawn in May which indicates that he has hypertriglyceridemia.  I have asked him to start fenofibrate.      Return in about 6 weeks (around 7/23/2024).    Patient was given instructions and counseling regarding his condition or for health maintenance advice. Please see specific information pulled into the AVS if appropriate.

## 2024-06-12 VITALS
DIASTOLIC BLOOD PRESSURE: 85 MMHG | HEIGHT: 71 IN | SYSTOLIC BLOOD PRESSURE: 129 MMHG | OXYGEN SATURATION: 98 % | HEART RATE: 105 BPM | WEIGHT: 198 LBS | BODY MASS INDEX: 27.72 KG/M2

## 2024-09-25 RX ORDER — METOPROLOL SUCCINATE 25 MG/1
TABLET, EXTENDED RELEASE ORAL
Qty: 30 TABLET | Refills: 3 | Status: SHIPPED | OUTPATIENT
Start: 2024-09-25

## 2025-04-08 RX ORDER — METOPROLOL SUCCINATE 25 MG/1
25 TABLET, EXTENDED RELEASE ORAL DAILY
Qty: 90 TABLET | Refills: 1 | Status: SHIPPED | OUTPATIENT
Start: 2025-04-08

## 2025-04-25 RX ORDER — METOPROLOL SUCCINATE 25 MG/1
25 TABLET, EXTENDED RELEASE ORAL DAILY
Qty: 90 TABLET | Refills: 1 | Status: CANCELLED | OUTPATIENT
Start: 2025-04-25

## 2025-08-18 DIAGNOSIS — E78.5 DYSLIPIDEMIA: Primary | Chronic | ICD-10-CM

## 2025-08-18 RX ORDER — FENOFIBRATE 145 MG/1
145 TABLET, FILM COATED ORAL DAILY
Qty: 90 TABLET | Refills: 3 | Status: SHIPPED | OUTPATIENT
Start: 2025-08-18